# Patient Record
Sex: MALE | Race: WHITE
[De-identification: names, ages, dates, MRNs, and addresses within clinical notes are randomized per-mention and may not be internally consistent; named-entity substitution may affect disease eponyms.]

---

## 2023-03-27 PROBLEM — Z00.00 ENCOUNTER FOR PREVENTIVE HEALTH EXAMINATION: Status: ACTIVE | Noted: 2023-03-27

## 2023-04-07 ENCOUNTER — APPOINTMENT (OUTPATIENT)
Dept: PLASTIC SURGERY | Facility: CLINIC | Age: 38
End: 2023-04-07
Payer: COMMERCIAL

## 2023-04-07 VITALS — HEIGHT: 75 IN | WEIGHT: 260 LBS | BODY MASS INDEX: 32.33 KG/M2

## 2023-04-07 PROCEDURE — 99203 OFFICE O/P NEW LOW 30 MIN: CPT

## 2023-04-07 NOTE — PHYSICAL EXAM
[NI] : Normal [de-identified] : right posterior scalp approx 3.2cm x 1.2cm    inflamed   ?c/w sebaceous nevus? [de-identified] : as above

## 2023-04-07 NOTE — HISTORY OF PRESENT ILLNESS
[FreeTextEntry1] : 37 yr old male\par lesion right posterior scalp present since a child--recently getting bigger\par nonsmoker (occasional marijuana)\par no prior h/o skin cancer\par nondiabetic

## 2023-04-07 NOTE — ASSESSMENT
[FreeTextEntry1] : too large to do in opffice\par \par [facility] and the risk of skin cancer development\par \par .skincacner\par \par Regarding the procedure, we discussed scarring, poor wound healing, bleeding, infection, need for additional surgery, and dissatisfaction with the outcome.  Also discussed possibility of keloid and/or hypertrophic scar formation as well as recurrence.  All questions were answered and risks understood.\par \par OMERO scheduling

## 2023-05-12 NOTE — ASU PATIENT PROFILE, ADULT - FALL HARM RISK - UNIVERSAL INTERVENTIONS
Bed in lowest position, wheels locked, appropriate side rails in place/Call bell, personal items and telephone in reach/Instruct patient to call for assistance before getting out of bed or chair/Non-slip footwear when patient is out of bed/[place] to call system/Physically safe environment - no spills, clutter or unnecessary equipment/Purposeful Proactive Rounding/Room/bathroom lighting operational, light cord in reach

## 2023-05-15 ENCOUNTER — TRANSCRIPTION ENCOUNTER (OUTPATIENT)
Age: 38
End: 2023-05-15

## 2023-05-15 ENCOUNTER — OUTPATIENT (OUTPATIENT)
Dept: INPATIENT UNIT | Facility: HOSPITAL | Age: 38
LOS: 1 days | Discharge: HOME | End: 2023-05-15
Payer: COMMERCIAL

## 2023-05-15 ENCOUNTER — RESULT REVIEW (OUTPATIENT)
Age: 38
End: 2023-05-15

## 2023-05-15 ENCOUNTER — APPOINTMENT (OUTPATIENT)
Dept: PLASTIC SURGERY | Facility: AMBULATORY SURGERY CENTER | Age: 38
End: 2023-05-15
Payer: COMMERCIAL

## 2023-05-15 VITALS
SYSTOLIC BLOOD PRESSURE: 150 MMHG | TEMPERATURE: 99 F | WEIGHT: 265 LBS | HEIGHT: 75 IN | DIASTOLIC BLOOD PRESSURE: 78 MMHG | RESPIRATION RATE: 20 BRPM | OXYGEN SATURATION: 99 % | HEART RATE: 73 BPM

## 2023-05-15 VITALS — SYSTOLIC BLOOD PRESSURE: 138 MMHG | RESPIRATION RATE: 12 BRPM | DIASTOLIC BLOOD PRESSURE: 77 MMHG | HEART RATE: 78 BPM

## 2023-05-15 DIAGNOSIS — D48.5 NEOPLASM OF UNCERTAIN BEHAVIOR OF SKIN: ICD-10-CM

## 2023-05-15 PROCEDURE — C9399: CPT

## 2023-05-15 PROCEDURE — 88307 TISSUE EXAM BY PATHOLOGIST: CPT | Mod: 26

## 2023-05-15 PROCEDURE — 88307 TISSUE EXAM BY PATHOLOGIST: CPT

## 2023-05-15 PROCEDURE — 11424 EXC H-F-NK-SP B9+MARG 3.1-4: CPT | Mod: 59

## 2023-05-15 PROCEDURE — 14020 TIS TRNFR S/A/L 10 SQ CM/<: CPT

## 2023-05-15 RX ORDER — OXYCODONE AND ACETAMINOPHEN 5; 325 MG/1; MG/1
1 TABLET ORAL EVERY 4 HOURS
Refills: 0 | Status: DISCONTINUED | OUTPATIENT
Start: 2023-05-15 | End: 2023-05-15

## 2023-05-15 RX ORDER — ONDANSETRON 8 MG/1
4 TABLET, FILM COATED ORAL ONCE
Refills: 0 | Status: DISCONTINUED | OUTPATIENT
Start: 2023-05-15 | End: 2023-05-15

## 2023-05-15 RX ORDER — SODIUM CHLORIDE 9 MG/ML
1000 INJECTION, SOLUTION INTRAVENOUS
Refills: 0 | Status: DISCONTINUED | OUTPATIENT
Start: 2023-05-15 | End: 2023-05-15

## 2023-05-15 RX ORDER — ONDANSETRON 8 MG/1
1 TABLET, FILM COATED ORAL
Qty: 9 | Refills: 0
Start: 2023-05-15 | End: 2023-05-17

## 2023-05-15 RX ORDER — TRAMADOL HYDROCHLORIDE 50 MG/1
1 TABLET ORAL
Qty: 12 | Refills: 0
Start: 2023-05-15 | End: 2023-05-17

## 2023-05-15 RX ADMIN — SODIUM CHLORIDE 100 MILLILITER(S): 9 INJECTION, SOLUTION INTRAVENOUS at 15:32

## 2023-05-15 NOTE — BRIEF OPERATIVE NOTE - NSICDXBRIEFPROCEDURE_GEN_ALL_CORE_FT
PROCEDURES:  Excision, lesion, benign, scalp, 1.1 to 2.0 cm in diameter 15-May-2023 15:21:11  Modesta Susan

## 2023-05-15 NOTE — ASU DISCHARGE PLAN (ADULT/PEDIATRIC) - NS MD DC FALL RISK RISK
For information on Fall & Injury Prevention, visit: [email or web address] OR  [email or web address] OR  https://www.cdc.gov/steadi/patient.html

## 2023-05-15 NOTE — ASU PREOP CHECKLIST - WARM FLUIDS/WARM BLANKETS
----- Message from Shayy Cunningham sent at 11/13/2018  2:40 PM CST -----  Contact: Sari  Patient is calling regarding paperwork for a life vest--she said she spoke with someone yesterday at the [employer] and they sent it out electronically on 11/8/18 and we didn't have the paperwork yet so she is calling today to see if we got it--She can be reached at [phone]--Thank you   no

## 2023-05-15 NOTE — ASU DISCHARGE PLAN (ADULT/PEDIATRIC) - ASU DC SPECIAL INSTRUCTIONSFT
Please follow up with Dr. Alfonso. Please call his office at the number provided to schedule this appointment. Please call within 48 hours of leaving the hospital.     NOTIFY YOUR SURGEON IF YOU HAVE: any bleeding that does not stop, any pus draining from your wound(s), any fever (over 100.4 F) persistent nausea/vomiting, or if your pain is not controlled on your discharge pain medications, unable to urinate.    Nausea: Should you experiencr nausea after surgery, Zofran (a anti-nausea medication), has been sent to your pharmacy. You can take this every 8 hours or as needed for nausea    ACTIVITY: Please refrain from increased physical activity for a period of 2 weeks. Please do not lift anything heavier than a gallon of milk or about 5 pounds. Upon follow up you will be given further instructions on how much physical activity you may do.     ANTIBIOTICS: Please take any prescribed antibiotics as directed. These will be sent to your designated pharmacy    Dressing: please leave dressing in place until follow up. Please keep the dressing clean and dry while you recover. **If the dressing falls off: You have a YELLOW gauze that is sutured over the wound. LEAVE THIS ON. Gently clean around the wound and place Bacitracin on the edges of the yellow gauze with a bandage covering it (if a bandage does not stick, just leave the yellow gauze)    Sleep with your head elevated, propped up with pillows. This will assist in minimizing swelling

## 2023-05-15 NOTE — ASU DISCHARGE PLAN (ADULT/PEDIATRIC) - CARE PROVIDER_API CALL
Eduar Alfonso)  Plastic Surgery; Surgery of the Hand  [address] [place] [address], [address]  [place] [address]  Phone: [phone]  Fax: [phone]  Established Patient  Follow Up Time: 1 week

## 2023-05-15 NOTE — CHART NOTE - NSCHARTNOTEFT_GEN_A_CORE
PACU ANESTHESIA ADMISSION NOTE      Procedure: Excision, lesion, benign, scalp, 1.1 to 2.0 cm in diameter      Post op diagnosis:  Linear sebaceous nevus sequence of scalp        ____  Intubated  TV:______       Rate: ______      FiO2: ______    _x___  Patent Airway    _x___  Full return of protective reflexes    _x___  Full recovery from anesthesia / back to baseline status    Vitals  SPO2:-96  HR:-89  RR:-11  B.P:-147/83  TEMP:-98    Mental Status:  _x___ Awake   ___x_ Alert   _____ Drowsy   _____ Sedated    Nausea/Vomiting:  _x___  NO       ______Yes,   See Post - Op Orders         Pain Scale (0-10):  __0___    Treatment: _x___ None    __x__ See Post - Op/PCA Orders    Post - Operative Fluids:   ___ Oral   ____x See Post - Op Orders    Plan: Discharge:   __x_Home       ___Floor     _____Critical Care    _____  Other:_________________    Comments:  Report endorsed to RN in pacu  Vitals stable  No anesthesia issues or complications noted.  Discharge to patient to home when criteria met.

## 2023-05-15 NOTE — ASU DISCHARGE PLAN (ADULT/PEDIATRIC) - PAIN MANAGEMENT
Please take 1000mg Tylenol every 6 hours as needed. Please do not exceed 4000mg Tylenol in any 24 hour period.  Tramadol has been sent to your pharmacy, you can take this every 6 to 8 hours or as needed for severe pain/[facility]

## 2023-05-16 ENCOUNTER — NON-APPOINTMENT (OUTPATIENT)
Age: 38
End: 2023-05-16

## 2023-05-18 DIAGNOSIS — R22.0 LOCALIZED SWELLING, MASS AND LUMP, HEAD: ICD-10-CM

## 2023-05-18 LAB — SURGICAL PATHOLOGY STUDY: SIGNIFICANT CHANGE UP

## 2023-05-22 ENCOUNTER — APPOINTMENT (OUTPATIENT)
Dept: PLASTIC SURGERY | Facility: CLINIC | Age: 38
End: 2023-05-22
Payer: COMMERCIAL

## 2023-05-22 PROBLEM — Z78.9 OTHER SPECIFIED HEALTH STATUS: Chronic | Status: ACTIVE | Noted: 2023-05-15

## 2023-05-22 PROCEDURE — 99024 POSTOP FOLLOW-UP VISIT: CPT

## 2023-05-22 NOTE — ASSESSMENT
[FreeTextEntry1] : Pt is 1 week s/p excision of R posterior scalp sebaceous of Erica. Doing very well \par \par - D/c bolster dressing \par - Leave chromics. Aquaphor to incision line daily \par - Daily SPF\par - signs and symptoms of infection reviewed\par - No heavy lifting/pushing/pulling, light activity ok \par - Ok to shower, no submerging yet \par - Path reviewed with pt \par - All post op instructions reviewed, all questions answered\par - f/u 1 month for scar management, d/c remaining chromics if needed\par

## 2023-05-22 NOTE — HISTORY OF PRESENT ILLNESS
[FreeTextEntry1] : 37 yr old male\par lesion right posterior scalp present since a child--recently getting bigger\par nonsmoker (occasional marijuana)\par no prior h/o skin cancer\par nondiabetic\par \par Interval hx (5/22/23): Pt is 1 week s/p excision of R posterior scalp sebaceous of Erica. Pt is doing well without any significant issues, denies fever/chills/n/v or pain, +itching to area

## 2023-05-22 NOTE — DATA REVIEWED
[FreeTextEntry1] : \par  Pathology             Final\par \par No Documents Attached\par \par \par \par \par   [facility] Accession Number : [id number]\par Patient:   ELIZABETH CARR\par \par \par Accession:                             [id number]\par \par Collected Date/Time:                   5/15/2023 14:45 EDT\par Received Date/Time:                    5/16/2023 09:36 EDT\par \par Surgical Pathology Report - Auth (Verified)\par \par Specimen(s) Submitted\par Right posterior scalp lesion\par \par Final Diagnosis\par Right posterior scalp lesion, excisional biopsy:\par -Consistent with nevus sebaceous ( of Jadassohn) , polypoid in shape and\par excoriated with focal basaloid proliferation ( on slide 1k).  See note\par below\par \par -Resection margins free of the lesion.\par \par Note: The case was received intradepartmental peer review and the\par diagnosis represents a consensus of opinion.\par Verified by: Dustin Valencia M.D.\par (Electronic Signature)\par Reported on: 05/18/23 18:06 EDT, [facility],\par [address] [place] [address]\par Phone: [phone]   Fax: [phone]\par _________________________________________________________________\par \par \par Clinical Information\par Excision right posterior scalp lesion\par Right posterior scalp lesion R/O sebaceous nevus of Jadassohn\par \par Perioperative Diagnosis\par Right posterior scalp lesion\par \par Gross Description\par Received in formalin labeled "right posterior scalp lesion stitch marks\par 12:00 margin".  The specimen consists of 1 oriented tan skin ellipse\par measuring 4.5 x 1.9 x 0.5 cm.  There is a suture designating 12:00.\par There are 3 raised lesions measuring from 0.3 x 0.3 cm-0.8 x 0.5 cm.\par The largest lesion is 1.3 cm from the 12:00 margin 2.4 cm from the 6:00\par margin 0.4 cm from the 3:00 margin and 1.0 cm from the 9:00 margin.  The\par smallest lesion is 3.0 cm from the 12:00 margin, 1.4 cm from the 6:00\par margin, 0.7 cm from the 3:00 margin and 0.9 cm from the 9:00 margin the\par midsized lesion is 2.5 cm from the 12:00 margin, 1.7 cm from the 6:00\par margin, 1.2 cm from the 3:00 margin and 0.4 cm from the 9:00 margin.\par The 12-3-6 o'clock margin is inked blue, the 6-9-12 o'clock margin is\par inked green and the deep margin is inked black.  The specimen is serially\par sectioned from the 12:00 tip (suture) to the 6:00 tip and entirely\par submitted.\par \par \par Summary of sections:\par 1A-12:00-bisected tip  (suture) -1\par 4T-7R-brhggm sections  (5N-4G-cjfpfvt lesion; 9W-7Z-mupbcqxk lesion;\par 6X-5H-fuqawrbg lesion) -19\par 1U-6:00-bisected tip -1\par \par Total blocks - 21\par \par Specimen was received and underwent gross examination at [place]\[facility], [address] [place] [address].\par \par 05/16/2023 11:58:19 EDT   LB\par \par  \par \par  Ordered by: CAROLINE ASH IV       Collected/Examined: [id number] 02:45PM       \par Verification Required       Stage: Final       \par  Performed at: [facility]       Resulted: [id number] 06:06PM       Last Updated: 18May2023 06:06PM       Accession: [id number]

## 2023-06-09 ENCOUNTER — APPOINTMENT (OUTPATIENT)
Dept: PLASTIC SURGERY | Facility: CLINIC | Age: 38
End: 2023-06-09
Payer: COMMERCIAL

## 2023-06-09 DIAGNOSIS — D22.9 MELANOCYTIC NEVI, UNSPECIFIED: ICD-10-CM

## 2023-06-09 PROCEDURE — 99024 POSTOP FOLLOW-UP VISIT: CPT

## 2023-06-09 NOTE — ASSESSMENT
[FreeTextEntry1] : Pt is approx 1M s/p excision of R posterior scalp sebaceous of Erica. Doing very well \par \par - D/c spitting suture, aquaphor daily only \par - Daily SPF\par - signs and symptoms of infection reviewed\par - Activity ok\par - Ok to shower\par - Path reviewed with pt \par - All post op instructions reviewed, all questions answered\par - f/u pRn \par

## 2023-06-09 NOTE — PHYSICAL EXAM
[NI] : Normal [de-identified] : right posterior scalp incision is CDI, skin edges well approximated with small spitting suture.No fluid collection, no cellulitis or erythema [de-identified] : as above

## 2023-06-09 NOTE — DATA REVIEWED
[FreeTextEntry1] : \par  Pathology             Final\par \par No Documents Attached\par \par \par \par \par   [facility] Accession Number : [id number]\par Patient:   ELIZABETH ACRR\par \par \par Accession:                             [id number]\par \par Collected Date/Time:                   5/15/2023 14:45 EDT\par Received Date/Time:                    5/16/2023 09:36 EDT\par \par Surgical Pathology Report - Auth (Verified)\par \par Specimen(s) Submitted\par Right posterior scalp lesion\par \par Final Diagnosis\par Right posterior scalp lesion, excisional biopsy:\par -Consistent with nevus sebaceous ( of Jadassohn) , polypoid in shape and\par excoriated with focal basaloid proliferation ( on slide 1k).  See note\par below\par \par -Resection margins free of the lesion.\par \par Note: The case was received intradepartmental peer review and the\par diagnosis represents a consensus of opinion.\par Verified by: Dustin Valencia M.D.\par (Electronic Signature)\par Reported on: 05/18/23 18:06 EDT, [facility],\par [address] [place] [address]\par Phone: [phone]   Fax: [phone]\par _________________________________________________________________\par \par \par Clinical Information\par Excision right posterior scalp lesion\par Right posterior scalp lesion R/O sebaceous nevus of Jadassohn\par \par Perioperative Diagnosis\par Right posterior scalp lesion\par \par Gross Description\par Received in formalin labeled "right posterior scalp lesion stitch marks\par 12:00 margin".  The specimen consists of 1 oriented tan skin ellipse\par measuring 4.5 x 1.9 x 0.5 cm.  There is a suture designating 12:00.\par There are 3 raised lesions measuring from 0.3 x 0.3 cm-0.8 x 0.5 cm.\par The largest lesion is 1.3 cm from the 12:00 margin 2.4 cm from the 6:00\par margin 0.4 cm from the 3:00 margin and 1.0 cm from the 9:00 margin.  The\par smallest lesion is 3.0 cm from the 12:00 margin, 1.4 cm from the 6:00\par margin, 0.7 cm from the 3:00 margin and 0.9 cm from the 9:00 margin the\par midsized lesion is 2.5 cm from the 12:00 margin, 1.7 cm from the 6:00\par margin, 1.2 cm from the 3:00 margin and 0.4 cm from the 9:00 margin.\par The 12-3-6 o'clock margin is inked blue, the 6-9-12 o'clock margin is\par inked green and the deep margin is inked black.  The specimen is serially\par sectioned from the 12:00 tip (suture) to the 6:00 tip and entirely\par submitted.\par \par \par Summary of sections:\par 1A-12:00-bisected tip  (suture) -1\par 0T-2F-awsewg sections  (7C-1Y-gjpjxzu lesion; 6B-1Y-lyhhnwav lesion;\par 3N-9M-yxugwcrq lesion) -19\par 1U-6:00-bisected tip -1\par \par Total blocks - 21\par \par Specimen was received and underwent gross examination at [place]\[facility], [address] [place] [address].\par \par 05/16/2023 11:58:19 EDT   LB\par \par  \par \par  Ordered by: CAROLINE ASH IV       Collected/Examined: [id number] 02:45PM       \par Verification Required       Stage: Final       \par  Performed at: [facility]       Resulted: [id number] 06:06PM       Last Updated: 18May2023 06:06PM       Accession: [id number]

## 2023-06-22 ENCOUNTER — APPOINTMENT (OUTPATIENT)
Dept: PLASTIC SURGERY | Facility: CLINIC | Age: 38
End: 2023-06-22

## 2023-11-03 ENCOUNTER — APPOINTMENT (OUTPATIENT)
Dept: PLASTIC SURGERY | Facility: CLINIC | Age: 38
End: 2023-11-03

## 2024-05-01 NOTE — PHYSICAL EXAM
[NI] : Normal [de-identified] : right posterior scalp incision is CDI, skin edges well approximated with chromics, no open areas.No fluid collection, no cellulitis or erythema [de-identified] : as above Detail Level: Detailed General Sunscreen Counseling: I recommended a broad spectrum sunscreen with a SPF of 30 or higher.  I explained that SPF 30 sunscreens block approximately 97 percent of the sun's harmful rays.  Sunscreens should be applied at least 15 minutes prior to expected sun exposure and then every 2 hours after that as long as sun exposure continues. If swimming or exercising sunscreen should be reapplied every 45 minutes to an hour after getting wet or sweating.  One ounce, or the equivalent of a shot glass full of sunscreen, is adequate to protect the skin not covered by a bathing suit. I also recommended a lip balm with a sunscreen as well. Sun protective clothing can be used in lieu of sunscreen but must be worn the entire time you are exposed to the sun's rays. Products Recommended: Elta MD, Neutrogena line, Cetaphil with 50 spf, and or CeraVe with 50 spf

## 2025-05-09 ENCOUNTER — APPOINTMENT (OUTPATIENT)
Dept: PLASTIC SURGERY | Facility: CLINIC | Age: 40
End: 2025-05-09

## 2025-05-09 PROCEDURE — 11442 EXC FACE-MM B9+MARG 1.1-2 CM: CPT

## 2025-05-09 PROCEDURE — 13132 CMPLX RPR F/C/C/M/N/AX/G/H/F: CPT

## 2025-05-15 ENCOUNTER — APPOINTMENT (OUTPATIENT)
Dept: PLASTIC SURGERY | Facility: CLINIC | Age: 40
End: 2025-05-15
Payer: COMMERCIAL

## 2025-05-15 DIAGNOSIS — D48.5 NEOPLASM OF UNCERTAIN BEHAVIOR OF SKIN: ICD-10-CM

## 2025-05-15 PROCEDURE — 99024 POSTOP FOLLOW-UP VISIT: CPT

## 2025-05-16 LAB — CORE LAB BIOPSY: NORMAL

## 2025-07-01 ENCOUNTER — NON-APPOINTMENT (OUTPATIENT)
Age: 40
End: 2025-07-01